# Patient Record
Sex: MALE
[De-identification: names, ages, dates, MRNs, and addresses within clinical notes are randomized per-mention and may not be internally consistent; named-entity substitution may affect disease eponyms.]

---

## 2023-03-29 ENCOUNTER — NURSE TRIAGE (OUTPATIENT)
Dept: OTHER | Facility: CLINIC | Age: 33
End: 2023-03-29

## 2023-03-29 NOTE — TELEPHONE ENCOUNTER
Location of patient: [place]    Received call from Michael boothe at [facility] with Red Flag Complaint. Jenifer Simms MRN: [id number]    Provider: Johnathan Karimi    Subjective: Caller states has a hive rash that started yesterday around noon    Denies SOB, difficulty breathing, dizziness, weakness, feeling faint, difficulty swallowing    Current Symptoms: red hives      Pain Severity: 0/10; Temperature: denies     What has been tried: benadryl, triple antibiotic ointment     Recommended disposition: See in Office Today    Care advice provided, patient verbalizes understanding; denies any other questions or concerns.     Outcome:     No Appointments available[facility]       This triage is a result of a call to the [facility]    Reason for Disposition   MODERATE-SEVERE hives persist (i.e., hives interfere with normal activities or work) and taking antihistamine (e.g., Benadryl, Claritin) > 24 hours    Protocols used: Hives-ADULT-[place]